# Patient Record
Sex: FEMALE | Race: ASIAN | NOT HISPANIC OR LATINO | Employment: UNEMPLOYED | URBAN - METROPOLITAN AREA
[De-identification: names, ages, dates, MRNs, and addresses within clinical notes are randomized per-mention and may not be internally consistent; named-entity substitution may affect disease eponyms.]

---

## 2018-03-02 ENCOUNTER — TRANSCRIBE ORDERS (OUTPATIENT)
Dept: ADMINISTRATIVE | Facility: HOSPITAL | Age: 43
End: 2018-03-02

## 2018-03-02 DIAGNOSIS — Z12.31 ENCOUNTER FOR SCREENING MAMMOGRAM FOR MALIGNANT NEOPLASM OF BREAST: Primary | ICD-10-CM

## 2018-03-13 ENCOUNTER — HOSPITAL ENCOUNTER (OUTPATIENT)
Dept: MAMMOGRAPHY | Facility: HOSPITAL | Age: 43
Discharge: HOME | End: 2018-03-13
Payer: COMMERCIAL

## 2018-03-13 DIAGNOSIS — Z12.31 ENCOUNTER FOR SCREENING MAMMOGRAM FOR MALIGNANT NEOPLASM OF BREAST: ICD-10-CM

## 2018-03-13 PROCEDURE — 77067 SCR MAMMO BI INCL CAD: CPT

## 2019-08-30 ENCOUNTER — TRANSCRIBE ORDERS (OUTPATIENT)
Dept: ADMINISTRATIVE | Facility: HOSPITAL | Age: 44
End: 2019-08-30

## 2019-08-30 DIAGNOSIS — Z12.31 VISIT FOR SCREENING MAMMOGRAM: Primary | ICD-10-CM

## 2020-02-25 ENCOUNTER — TRANSCRIBE ORDERS (OUTPATIENT)
Dept: LAB | Facility: CLINIC | Age: 45
End: 2020-02-25

## 2020-02-25 ENCOUNTER — APPOINTMENT (OUTPATIENT)
Dept: LAB | Facility: CLINIC | Age: 45
End: 2020-02-25
Payer: COMMERCIAL

## 2020-02-25 DIAGNOSIS — R76.11 NONSPECIFIC REACTION TO TUBERCULIN TEST: ICD-10-CM

## 2020-02-25 DIAGNOSIS — R76.11 NONSPECIFIC REACTION TO TUBERCULIN TEST: Primary | ICD-10-CM

## 2020-02-25 PROCEDURE — 36415 COLL VENOUS BLD VENIPUNCTURE: CPT

## 2020-02-25 PROCEDURE — 86480 TB TEST CELL IMMUN MEASURE: CPT

## 2020-02-27 LAB
GAMMA INTERFERON BACKGROUND BLD IA-ACNC: 0.06 IU/ML
M TB IFN-G BLD-IMP: POSITIVE
M TB IFN-G CD4+ BCKGRND COR BLD-ACNC: 1.13 IU/ML
M TB IFN-G CD4+ BCKGRND COR BLD-ACNC: 1.28 IU/ML
MITOGEN IGNF BCKGRD COR BLD-ACNC: >10 IU/ML

## 2021-04-16 DIAGNOSIS — Z23 ENCOUNTER FOR IMMUNIZATION: ICD-10-CM

## 2021-05-02 ENCOUNTER — IMMUNIZATIONS (OUTPATIENT)
Dept: FAMILY MEDICINE CLINIC | Facility: HOSPITAL | Age: 46
End: 2021-05-02

## 2021-05-02 DIAGNOSIS — Z23 ENCOUNTER FOR IMMUNIZATION: Primary | ICD-10-CM

## 2021-05-02 PROCEDURE — 0001A SARS-COV-2 / COVID-19 MRNA VACCINE (PFIZER-BIONTECH) 30 MCG: CPT

## 2021-05-02 PROCEDURE — 91300 SARS-COV-2 / COVID-19 MRNA VACCINE (PFIZER-BIONTECH) 30 MCG: CPT

## 2021-05-27 ENCOUNTER — IMMUNIZATIONS (OUTPATIENT)
Dept: FAMILY MEDICINE CLINIC | Facility: HOSPITAL | Age: 46
End: 2021-05-27

## 2021-05-27 DIAGNOSIS — Z23 ENCOUNTER FOR IMMUNIZATION: Primary | ICD-10-CM

## 2021-05-27 PROCEDURE — 91300 SARS-COV-2 / COVID-19 MRNA VACCINE (PFIZER-BIONTECH) 30 MCG: CPT | Performed by: PHYSICIAN ASSISTANT

## 2021-05-27 PROCEDURE — 0002A SARS-COV-2 / COVID-19 MRNA VACCINE (PFIZER-BIONTECH) 30 MCG: CPT | Performed by: PHYSICIAN ASSISTANT

## 2022-04-06 ENCOUNTER — HOSPITAL ENCOUNTER (OUTPATIENT)
Dept: MAMMOGRAPHY | Facility: HOSPITAL | Age: 47
Discharge: HOME | End: 2022-04-06
Payer: COMMERCIAL

## 2022-04-06 VITALS — BODY MASS INDEX: 31.15 KG/M2 | WEIGHT: 165 LBS | HEIGHT: 61 IN

## 2022-04-06 DIAGNOSIS — Z12.31 VISIT FOR SCREENING MAMMOGRAM: ICD-10-CM

## 2022-04-06 PROCEDURE — 77067 SCR MAMMO BI INCL CAD: CPT

## 2022-04-06 PROCEDURE — 77063 BREAST TOMOSYNTHESIS BI: CPT

## 2024-12-19 ENCOUNTER — OFFICE VISIT (OUTPATIENT)
Age: 49
End: 2024-12-19

## 2024-12-19 DIAGNOSIS — H52.4 PRESBYOPIA: Primary | ICD-10-CM

## 2024-12-19 RX ORDER — LEVOTHYROXINE SODIUM 75 UG/1
75 TABLET ORAL DAILY
COMMUNITY

## 2024-12-19 NOTE — PROGRESS NOTES
Name: My Apple      : 1975      MRN: [id number]  Encounter Provider: Best Ramirez DO  Encounter Date: 2024   Encounter department: [facility] OPHTHALMOLOGY  :  Assessment & Plan  Presbyopia  -she is pleased with [facility] for od and os  -recommend otc reading spectacles (informed might need a slightly different power for computer work than for reading at near)       Return in about 1 year (around 2025).    My Apple is a 49 y.o. female who presents   HPI       Blurred Vision    In both eyes.  Onset was gradual.  Context:  near vision.             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          History obtained from: patient    Review of Systems       Base Eye Exam       Visual Acuity (Snellen - Linear)         Right Left    Dist sc 20/20 20/20    Near sc 20/50 20/25              Tonometry    Poor cooperation per technician             Pupils         Pupils    Right PERRL    Left PERRL   Pharm dilated ou prior to doctor exam                Visual Fields         Left Right     Full Full              Extraocular Movement         Right Left     Full, Ortho Full, Ortho              Neuro/Psych       Oriented x3: Yes              Dilation       Both eyes: 2.5% Phenylephrine @ 8:40 AM                  Slit Lamp and Fundus Exam       External Exam         Right Left    External Normal Normal              Slit Lamp Exam         Right Left    Lids/Lashes Normal Normal    Conjunctiva/Sclera White and quiet White and quiet    Cornea Clear Clear    Anterior Chamber Deep and quiet Deep and quiet    Iris pharm dil pharm dil    Lens clear clear    Anterior Vitreous No PVD, clear, no cell No PVD, clear, no cell    Pharm dilated ou prior to doctor exam              Fundus Exam         Right Left    Disc good  color, large onh good color, large onh    C/D Ratio 0.5 0.4    Macula flat/no heme / good foveal reflex flat/no heme / good foveal reflex    Vessels normal in caliber normal in caliber    Periphery flat, no retinal tear or detachment flat, no retinal tear or detachment

## 2024-12-19 NOTE — PROGRESS NOTES
Name: My Apple      : 1975      MRN: [id number]  Encounter Provider: Best Ramirez DO  Encounter Date: 2024   Encounter department: [facility] OPHTHALMOLOGY  :  Assessment & Plan            My Apple is a 49 y.o. female who presents for blurry vision OU for near, mostly when works in the computer. Denies glare or halos. No glasses  History obtained from: patient    Review of Systems  Medical History Reviewed by provider this encounter:     .     Past Ocular History:  N/A  Ocular Meds/Drops:   N/A    Base Eye Exam       Visual Acuity (Snellen - Linear)         Right Left    Dist sc 20/20 20/20    Near sc 20/50 20/25              Pupils         Pupils    Right PERRL    Left PERRL              Visual Fields         Left Right     Full Full              Extraocular Movement         Right Left     Full, Ortho Full, Ortho              Neuro/Psych       Oriented x3: Yes                  Slit Lamp and Fundus Exam       External Exam         Right Left    External Normal Normal              Slit Lamp Exam         Right Left    Lids/Lashes Normal Normal    Conjunctiva/Sclera White and quiet White and quiet    Cornea Clear Clear    Anterior Chamber Deep and quiet Deep and quiet    Iris Round and reactive Round and reactive    Anterior Vitreous No PVD, clear, no cell No PVD, clear, no cell                      IMAGING:

## 2025-05-03 ENCOUNTER — HOSPITAL ENCOUNTER (EMERGENCY)
Facility: HOSPITAL | Age: 50
Discharge: HOME | End: 2025-05-03
Attending: EMERGENCY MEDICINE
Payer: COMMERCIAL

## 2025-05-03 VITALS
SYSTOLIC BLOOD PRESSURE: 119 MMHG | HEART RATE: 110 BPM | BODY MASS INDEX: 27.58 KG/M2 | RESPIRATION RATE: 18 BRPM | DIASTOLIC BLOOD PRESSURE: 57 MMHG | OXYGEN SATURATION: 96 % | TEMPERATURE: 98.1 F | WEIGHT: 145.94 LBS

## 2025-05-03 DIAGNOSIS — R03.0 ELEVATED BLOOD PRESSURE READING: ICD-10-CM

## 2025-05-03 DIAGNOSIS — T78.2XXA ANAPHYLAXIS, INITIAL ENCOUNTER: Primary | ICD-10-CM

## 2025-05-03 LAB
ALBUMIN SERPL BCG-MCNC: 3.9 G/DL (ref 3.5–5)
ALP SERPL-CCNC: 104 U/L (ref 34–104)
ALT SERPL W P-5'-P-CCNC: 16 U/L (ref 7–52)
ANION GAP SERPL CALCULATED.3IONS-SCNC: 8 MMOL/L (ref 4–13)
AST SERPL W P-5'-P-CCNC: 11 U/L (ref 13–39)
BASOPHILS # BLD AUTO: 0.02 THOUSANDS/ÂΜL (ref 0–0.1)
BASOPHILS NFR BLD AUTO: 0 % (ref 0–1)
BILIRUB SERPL-MCNC: 0.51 MG/DL (ref 0.2–1)
BUN SERPL-MCNC: 14 MG/DL (ref 5–25)
CALCIUM SERPL-MCNC: 9 MG/DL (ref 8.4–10.2)
CHLORIDE SERPL-SCNC: 103 MMOL/L (ref 96–108)
CO2 SERPL-SCNC: 25 MMOL/L (ref 21–32)
CREAT SERPL-MCNC: 0.56 MG/DL (ref 0.6–1.3)
EOSINOPHIL # BLD AUTO: 0.21 THOUSAND/ÂΜL (ref 0–0.61)
EOSINOPHIL NFR BLD AUTO: 2 % (ref 0–6)
ERYTHROCYTE [DISTWIDTH] IN BLOOD BY AUTOMATED COUNT: 14.1 % (ref 11.6–15.1)
EXT PREGNANCY TEST URINE: NEGATIVE
EXT. CONTROL: NORMAL
GFR SERPL CREATININE-BSD FRML MDRD: 111 ML/MIN/1.73SQ M
GLUCOSE SERPL-MCNC: 195 MG/DL (ref 65–140)
HCT VFR BLD AUTO: 37.9 % (ref 34.8–46.1)
HGB BLD-MCNC: 12.9 G/DL (ref 11.5–15.4)
IMM GRANULOCYTES # BLD AUTO: 0.08 THOUSAND/UL (ref 0–0.2)
IMM GRANULOCYTES NFR BLD AUTO: 1 % (ref 0–2)
LYMPHOCYTES # BLD AUTO: 2.62 THOUSANDS/ÂΜL (ref 0.6–4.47)
LYMPHOCYTES NFR BLD AUTO: 20 % (ref 14–44)
MCH RBC QN AUTO: 27.7 PG (ref 26.8–34.3)
MCHC RBC AUTO-ENTMCNC: 34 G/DL (ref 31.4–37.4)
MCV RBC AUTO: 82 FL (ref 82–98)
MONOCYTES # BLD AUTO: 0.51 THOUSAND/ÂΜL (ref 0.17–1.22)
MONOCYTES NFR BLD AUTO: 4 % (ref 4–12)
NEUTROPHILS # BLD AUTO: 9.73 THOUSANDS/ÂΜL (ref 1.85–7.62)
NEUTS SEG NFR BLD AUTO: 73 % (ref 43–75)
NRBC BLD AUTO-RTO: 0 /100 WBCS
PLATELET # BLD AUTO: 291 THOUSANDS/UL (ref 149–390)
PMV BLD AUTO: 10.2 FL (ref 8.9–12.7)
POTASSIUM SERPL-SCNC: 4.2 MMOL/L (ref 3.5–5.3)
PROT SERPL-MCNC: 7 G/DL (ref 6.4–8.4)
RBC # BLD AUTO: 4.65 MILLION/UL (ref 3.81–5.12)
SODIUM SERPL-SCNC: 136 MMOL/L (ref 135–147)
WBC # BLD AUTO: 13.17 THOUSAND/UL (ref 4.31–10.16)

## 2025-05-03 PROCEDURE — 96372 THER/PROPH/DIAG INJ SC/IM: CPT

## 2025-05-03 PROCEDURE — 96360 HYDRATION IV INFUSION INIT: CPT

## 2025-05-03 PROCEDURE — 99283 EMERGENCY DEPT VISIT LOW MDM: CPT

## 2025-05-03 PROCEDURE — 96375 TX/PRO/DX INJ NEW DRUG ADDON: CPT

## 2025-05-03 PROCEDURE — 81025 URINE PREGNANCY TEST: CPT | Performed by: EMERGENCY MEDICINE

## 2025-05-03 PROCEDURE — 96374 THER/PROPH/DIAG INJ IV PUSH: CPT

## 2025-05-03 PROCEDURE — 85025 COMPLETE CBC W/AUTO DIFF WBC: CPT

## 2025-05-03 PROCEDURE — 99285 EMERGENCY DEPT VISIT HI MDM: CPT | Performed by: EMERGENCY MEDICINE

## 2025-05-03 PROCEDURE — 80053 COMPREHEN METABOLIC PANEL: CPT

## 2025-05-03 PROCEDURE — 36415 COLL VENOUS BLD VENIPUNCTURE: CPT

## 2025-05-03 RX ORDER — EPINEPHRINE 1 MG/ML
INJECTION, SOLUTION, CONCENTRATE INTRAVENOUS
Status: DISCONTINUED
Start: 2025-05-03 | End: 2025-05-03

## 2025-05-03 RX ORDER — DIPHENHYDRAMINE HYDROCHLORIDE 50 MG/ML
25 INJECTION, SOLUTION INTRAMUSCULAR; INTRAVENOUS ONCE
Status: COMPLETED | OUTPATIENT
Start: 2025-05-03 | End: 2025-05-03

## 2025-05-03 RX ORDER — FAMOTIDINE 10 MG/ML
INJECTION, SOLUTION INTRAVENOUS
Status: DISCONTINUED
Start: 2025-05-03 | End: 2025-05-03

## 2025-05-03 RX ORDER — FAMOTIDINE 10 MG/ML
20 INJECTION, SOLUTION INTRAVENOUS ONCE
Status: COMPLETED | OUTPATIENT
Start: 2025-05-03 | End: 2025-05-03

## 2025-05-03 RX ORDER — EPINEPHRINE 0.3 MG/.3ML
0.3 INJECTION SUBCUTANEOUS ONCE
Qty: 0.6 ML | Refills: 0 | Status: SHIPPED | OUTPATIENT
Start: 2025-05-03 | End: 2025-05-03

## 2025-05-03 RX ORDER — EPINEPHRINE 1 MG/ML
0.5 INJECTION, SOLUTION, CONCENTRATE INTRAVENOUS ONCE
Status: COMPLETED | OUTPATIENT
Start: 2025-05-03 | End: 2025-05-03

## 2025-05-03 RX ORDER — METHYLPREDNISOLONE SODIUM SUCCINATE 125 MG/2ML
125 INJECTION, POWDER, LYOPHILIZED, FOR SOLUTION INTRAMUSCULAR; INTRAVENOUS ONCE
Status: COMPLETED | OUTPATIENT
Start: 2025-05-03 | End: 2025-05-03

## 2025-05-03 RX ORDER — DIPHENHYDRAMINE HYDROCHLORIDE 50 MG/ML
INJECTION, SOLUTION INTRAMUSCULAR; INTRAVENOUS
Status: DISCONTINUED
Start: 2025-05-03 | End: 2025-05-03

## 2025-05-03 RX ADMIN — EPINEPHRINE 0.5 MG: 1 INJECTION, SOLUTION, CONCENTRATE INTRAVENOUS at 02:42

## 2025-05-03 RX ADMIN — FAMOTIDINE 20 MG: 10 INJECTION INTRAVENOUS at 02:16

## 2025-05-03 RX ADMIN — METHYLPREDNISOLONE SODIUM SUCCINATE 125 MG: 125 INJECTION, POWDER, FOR SOLUTION INTRAMUSCULAR; INTRAVENOUS at 02:22

## 2025-05-03 RX ADMIN — SODIUM CHLORIDE 1000 ML: 0.9 INJECTION, SOLUTION INTRAVENOUS at 02:45

## 2025-05-03 RX ADMIN — FAMOTIDINE 20 MG: 10 INJECTION, SOLUTION INTRAVENOUS at 02:16

## 2025-05-03 RX ADMIN — DIPHENHYDRAMINE HYDROCHLORIDE 25 MG: 50 INJECTION, SOLUTION INTRAMUSCULAR; INTRAVENOUS at 02:13

## 2025-05-03 NOTE — ED NOTES
Patients hives on chest and right arm have improved post medication administration.      Miley Vora, RN  05/03/25 4181

## 2025-05-03 NOTE — ED ATTENDING ATTESTATION
5/3/2025  I, Fly Schmidt MD, saw and evaluated the patient. I have discussed the patient with the resident/non-physician practitioner and agree with the resident's/non-physician practitioner's findings, Plan of Care, and MDM as documented in the resident's/non-physician practitioner's note, except where noted. All available labs and Radiology studies were reviewed.  I was present for key portions of any procedure(s) performed by the resident/non-physician practitioner and I was immediately available to provide assistance.       At this point I agree with the current assessment done in the Emergency Department.  I have conducted an independent evaluation of this patient a history and physical is as follows:    History    Patient is a 49-year-old female, with no pertinent medical history per my review of the medical record but history of a roughly 1 month of urticarial rash for which patient has seen dermatology and has been on steroid taper for about 1 week per patient, who presents to the emergency department today due to evaluation of gradual onset, constant, progressively worsening facial swelling since 1130p on 5/2.  Patient has continued rash.  No clear exacerbating factors.  No family history of angioedema or facial swelling.  Patient's only medication is levothyroxine.  No ACE inhibitor's or ARB's.  Patient took prednisone to try remit her symptoms.    Patient's son, present in room and providing collateral history, states patient is not confused.  He reports that her voice is normal.    Patient is without other concerns at this time.     ROS  Patient denies: Fever; dysphagia; vision change; chest pain; dyspnea; abdominal pain; polyuria; dysuria; rash; weakness; numbness; difficulty walking; confusion    Physical Exam    GENERAL APPEARANCE: NAD  NEURO: Patient is speaking clearly in complete sentences.  Patient is answering appropriately and able follow commands.  Patient is moving all four  extremities spontaneously.  No facial droop.  Tongue midline.  HEENT: PERRL, Moist mucous membranes, external ears normal, nose normal.  No stridor.  Mallampati 3.  Edema of the upper lip and face between lip and eye.  Neck: No cervical adenopathy  CV: RRR. No murmurs, rubs, gallops  LUNGS: Clear to auscultation: No wheezes, stridor, rhonchi, rales  GI: Abdomen non-distended. Soft. Non-tender and without rebound or guarding   : Deferred at this time  MSK: No deformity.  Blanchable/Nikolsky negative/urticarial rash on extremities.  Improved from picture patient provided.  Skin: Warm and dry  Capillary refill: <2 seconds    Patient is currently afebrile and hemodynamically stable.    Assessment/Plan/MDM  Facial swelling, rash  -This presentation is concerning for: Allergic reaction, anaphylaxis, LUANN.  Lower suspicion for angioedema.  Pregnancy/pregnancy complication also considered  - Will investigate with pregnancy test, CBC, CMP  - Will manage with epinephrine, steroids, antihistamines, further based on    ED Course  ED Course as of 05/03/25 0426   Sat May 03, 2025   0242 PREGNANCY TEST URINE: Negative  WNL    0338 On reevaluation, facial swelling not progressed.  Possible slight improvement.  Rash improved.  Voice clear   0422 On reevaluation, patient reports partial improvement in symptoms.  Patient's son states her facial swelling is slightly improved.  Uvula midline and nonedematous.  Patient denies globus sensation/dyspnea/sensation of throat swelling.         Critical Care Time  Procedures

## 2025-05-03 NOTE — ED PROVIDER NOTES
Time reflects when diagnosis was documented in both MDM as applicable and the Disposition within this note       Time User Action Codes Description Comment    5/3/2025  2:49 AM Luz Maldonado Add [T78.2XXA] Anaphylaxis, initial encounter     5/3/2025  2:50 AM Luz Maldonado Add [R03.0] Elevated blood pressure reading           ED Disposition       ED Disposition   Discharge    Condition   Stable    Date/Time   Sat May 3, 2025  4:25 AM    Comment   My Apple discharge to home/self care.                   Assessment & Plan       Medical Decision Making  49-year-old female presents with swelling of the face and urticarial pruritic rash.  Treated patient for anaphylaxis with epi, Benadryl, famotidine, fluids, steroids.  Patient currently following with dermatology and being treated with steroid taper.  Patient is otherwise hemodynamically stable, afebrile, protecting airway, lungs clear to auscultation, normal cardiopulmonary exam.  Slight improvement in facial swelling and resolution of rash during period of ED observation.  Shared decision with patient regarding admission for continued observation versus discharge home to self-care with referral and follow-up to allergy and prescription for EpiPen.  Patient states that she wishes to return home to self-care.  Continue steroid taper.  Continue antihistamine use.  Referral to allergy.  Prescription for EpiPen.  Discussed how to use EpiPen.  Advised to follow-up to the emergency department if she required use of EpiPen.  Patient was discharged home to self-care with strict return precautions for continued or worsening symptoms.    Amount and/or Complexity of Data Reviewed  Labs: ordered.    Risk  Prescription drug management.             Medications   methylPREDNISolone sodium succinate (Solu-MEDROL) injection 125 mg (125 mg Intravenous Given 5/3/25 0222)   diphenhydrAMINE (BENADRYL) injection 25 mg (25 mg Intravenous Given 5/3/25 0213)   Famotidine (PF) (PEPCID)  injection 20 mg (20 mg Intravenous Given 5/3/25 0216)   sodium chloride 0.9 % bolus 1,000 mL (0 mL Intravenous Stopped 5/3/25 2095)   EPINEPHrine PF (ADRENALIN) 1 mg/mL injection 0.5 mg (0.5 mg Intramuscular Given 5/3/25 0242)       ED Risk Strat Scores                    No data recorded                            History of Present Illness       Chief Complaint   Patient presents with    Allergic Reaction     Pt reporting red raised rash all over body since 5/13. Pts eyes visibly swollen as well as upper lip. Pt reports no difficulty breathing.        History reviewed. No pertinent past medical history.   History reviewed. No pertinent surgical history.   Family History   Problem Relation Age of Onset    Glaucoma Father     No Known Problems Brother     No Known Problems Brother     No Known Problems Maternal Aunt     No Known Problems Maternal Aunt     No Known Problems Paternal Aunt     No Known Problems Paternal Aunt     No Known Problems Son     No Known Problems Son       Social History     Tobacco Use    Smoking status: Never    Smokeless tobacco: Never   Substance Use Topics    Alcohol use: Never    Drug use: Never      E-Cigarette/Vaping      E-Cigarette/Vaping Substances      I have reviewed and agree with the history as documented.     49-year-old female presents with allergic reaction.  Patient states 1 month history of itchy urticarial rash that forms on the arms, neck, legs for which she has followed with dermatology and started on course of prednisone taper.  Today at 2300 hrs. noted swelling of the face that progressively worsened.  Occurred shortly after work.  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  Denies new medications, perfumes, cosmetics, detergents, foods, exposures.  Denies fevers, chills, nausea, vomiting, headache, vision changes, sore throat, wheezing, stridor, cough, chest pain, shortness of breath, SAMUELS, abdominal pains, gastric upset, diarrhea, changes in urinary or  bowel habits.          Review of Systems   All other systems reviewed and are negative.          Objective       ED Triage Vitals   Temperature Pulse Blood Pressure Respirations SpO2 Patient Position - Orthostatic VS   05/03/25 0208 05/03/25 0208 05/03/25 0208 05/03/25 0208 05/03/25 0208 05/03/25 0208   98.1 °F (36.7 °C) 101 141/71 18 98 % Sitting      Temp Source Heart Rate Source BP Location FiO2 (%) Pain Score    05/03/25 0208 05/03/25 0208 05/03/25 0300 -- --    Oral Monitor Right arm        Vitals      Date and Time Temp Pulse SpO2 Resp BP Pain Score FACES Pain Rating User   05/03/25 0500 -- 110 96 % -- 119/57 -- --    05/03/25 0430 -- 92 96 % 18 117/59 -- --    05/03/25 0427 -- 95 -- -- 117/56 -- --    05/03/25 0418 -- -- -- -- 117/56 -- -- [facility]   05/03/25 0412 -- 91 -- -- 96/63 -- --    05/03/25 0357 -- 94 -- -- 94/61 -- --    05/03/25 0333 -- 90 97 % -- 107/59 -- --    05/03/25 0318 -- 94 98 % -- 121/64 -- --    05/03/25 0303 -- 94 98 % -- -- -- --    05/03/25 0300 -- 93 98 % 18 124/59 -- --    05/03/25 0248 -- 95 98 % -- 140/67 -- --    05/03/25 0233 -- 110 100 % -- 136/77 -- --    05/03/25 0218 -- 102 99 % -- 134/75 -- --    05/03/25 0208 98.1 °F (36.7 °C) 101 98 % 18 141/71 -- -- EG            Physical Exam  Vitals and nursing note reviewed.   Constitutional:       General: She is not in acute distress.     Appearance: Normal appearance. She is normal weight. She is not ill-appearing, toxic-appearing or diaphoretic.   HENT:      Head: Normocephalic and atraumatic.      Comments: Angioedema of the soft tissue of the face and periorbital region.     Right Ear: External ear normal.      Left Ear: External ear normal.      Nose: Nose normal.      Mouth/Throat:      Mouth: Mucous membranes are moist.      Pharynx: Oropharynx is clear.      Comments: Normal phonation.  Speaking in full sentences.  No angioedema of the oropharynx.  Mallampati 2.  Eyes:      General: No scleral icterus.         Right eye: No discharge.         Left eye: No discharge.      Extraocular Movements: Extraocular movements intact.   Cardiovascular:      Rate and Rhythm: Normal rate and regular rhythm.      Pulses: Normal pulses.      Heart sounds: Normal heart sounds. No murmur heard.  Pulmonary:      Effort: Pulmonary effort is normal. No respiratory distress.      Breath sounds: Normal breath sounds. No stridor. No wheezing, rhonchi or rales.   Chest:      Chest wall: No tenderness.   Abdominal:      General: There is no distension.      Palpations: Abdomen is soft. There is no mass.      Tenderness: There is no abdominal tenderness. There is no right CVA tenderness, left CVA tenderness, guarding or rebound.      Hernia: No hernia is present.   Musculoskeletal:         General: No swelling, tenderness, deformity or signs of injury.      Cervical back: Normal range of motion and neck supple. No rigidity or tenderness.      Right lower leg: No edema.      Left lower leg: No edema.   Skin:     General: Skin is warm and dry.      Capillary Refill: Capillary refill takes less than 2 seconds.      Coloration: Skin is not cyanotic, jaundiced or pale.      Findings: Rash (Urticarial rash on the neck, shoulder blades, legs, arms.) present. No bruising, erythema, lesion or petechiae.   Neurological:      General: No focal deficit present.      Mental Status: She is alert and oriented to person, place, and time. Mental status is at baseline.   Psychiatric:         Mood and Affect: Mood normal.         Behavior: Behavior normal.         Results Reviewed       Procedure Component Value Units Date/Time    POCT pregnancy, urine [[id number]]  (Normal) Collected: 05/03/25 0238    Lab Status: Final result Updated: 05/03/25 0239     EXT Preg Test, Ur Negative     Control Valid    Comprehensive metabolic panel [[id number]]  (Abnormal) Collected: 05/03/25 0211    Lab Status: Final result Specimen: Blood from Arm, Right Updated: 05/03/25 0231      Sodium 136 mmol/L      Potassium 4.2 mmol/L      Chloride 103 mmol/L      CO2 25 mmol/L      ANION GAP 8 mmol/L      BUN 14 mg/dL      Creatinine 0.56 mg/dL      Glucose 195 mg/dL      Calcium 9.0 mg/dL      AST 11 U/L      ALT 16 U/L      Alkaline Phosphatase 104 U/L      Total Protein 7.0 g/dL      Albumin 3.9 g/dL      Total Bilirubin 0.51 mg/dL      eGFR 111 ml/min/1.73sq m     Narrative:      National Kid[employer] Disease Foundation guidelines for Chronic Kidney Disease (CKD):     Stage 1 with normal or high GFR (GFR > 90 mL/min/1.73 square meters)    Stage 2 Mild CKD (GFR = 60-89 mL/min/1.73 square meters)    Stage 3A Moderate CKD (GFR = 45-59 mL/min/1.73 square meters)    Stage 3B Moderate CKD (GFR = 30-44 mL/min/1.73 square meters)    Stage 4 Severe CKD (GFR = 15-29 mL/min/1.73 square meters)    Stage 5 End Stage CKD (GFR <15 mL/min/1.73 square meters)  Note: GFR calculation is accurate only with a steady state creatinine    CBC and differential [[id number]]  (Abnormal) Collected: 05/03/25 0211    Lab Status: Final result Specimen: Blood from Arm, Right Updated: 05/03/25 0223     WBC 13.17 Thousand/uL      RBC 4.65 Million/uL      Hemoglobin 12.9 g/dL      Hematocrit 37.9 %      MCV 82 fL      MCH 27.7 pg      MCHC 34.0 g/dL      RDW 14.1 %      MPV 10.2 fL      Platelets 291 Thousands/uL      nRBC 0 /100 WBCs      Segmented % 73 %      Immature Grans % 1 %      Lymphocytes % 20 %      Monocytes % 4 %      Eosinophils Relative 2 %      Basophils Relative 0 %      Absolute Neutrophils 9.73 Thousands/µL      Absolute Immature Grans 0.08 Thousand/uL      Absolute Lymphocytes 2.62 Thousands/µL      Absolute Monocytes 0.51 Thousand/µL      Eosinophils Absolute 0.21 Thousand/µL      Basophils Absolute 0.02 Thousands/µL             No orders to display       Procedures    ED Medication and Procedure Management   Prior to Admission Medications   Prescriptions Last Dose Informant Patient Reported? Taking?   levothyroxine 75  mcg tablet   Yes No   Sig: Take 75 mcg by mouth daily      Facility-Administered Medications: None     Discharge Medication List as of 5/3/2025  4:26 AM        START taking these medications    Details   EPINEPHrine (EPIPEN) 0.3 mg/0.3 mL SOAJ Inject 0.3 mL (0.3 mg total) into a muscle once for 1 dose, Starting Sat 5/3/2025, Normal           CONTINUE these medications which have NOT CHANGED    Details   levothyroxine 75 mcg tablet Take 75 mcg by mouth daily, Historical Med             ED SEPSIS DOCUMENTATION   Time reflects when diagnosis was documented in both MDM as applicable and the Disposition within this note       Time User Action Codes Description Comment    5/3/2025  2:49 AM Luz Maldonado Add [T78.2XXA] Anaphylaxis, initial encounter     5/3/2025  2:50 AM Luz Maldonado Add [R03.0] Elevated blood pressure reading                  Luz Maldonado MD  05/03/25 0617

## 2025-05-03 NOTE — Clinical Note
My Apple was seen and treated in our emergency department on 5/3/2025.                Diagnosis:     My  .    She may return on this date: 05/05/2025         If you have any questions or concerns, please don't hesitate to call.      Luz Maldonado MD    ______________________________           _______________          _______________  Hospital Representative                              Date                                Time

## 2025-05-28 ENCOUNTER — HOSPITAL ENCOUNTER (EMERGENCY)
Facility: HOSPITAL | Age: 50
Discharge: HOME | End: 2025-05-29
Attending: EMERGENCY MEDICINE | Admitting: EMERGENCY MEDICINE
Payer: COMMERCIAL

## 2025-05-28 VITALS
RESPIRATION RATE: 20 BRPM | TEMPERATURE: 98.1 F | SYSTOLIC BLOOD PRESSURE: 166 MMHG | DIASTOLIC BLOOD PRESSURE: 81 MMHG | BODY MASS INDEX: 26.95 KG/M2 | WEIGHT: 142.64 LBS | HEART RATE: 104 BPM | OXYGEN SATURATION: 100 %

## 2025-05-28 DIAGNOSIS — T78.40XA ALLERGIC REACTION, INITIAL ENCOUNTER: Primary | ICD-10-CM

## 2025-05-28 PROCEDURE — 99284 EMERGENCY DEPT VISIT MOD MDM: CPT

## 2025-05-28 PROCEDURE — 99283 EMERGENCY DEPT VISIT LOW MDM: CPT

## 2025-05-28 RX ORDER — PREDNISONE 20 MG/1
60 TABLET ORAL ONCE
Status: COMPLETED | OUTPATIENT
Start: 2025-05-29 | End: 2025-05-29

## 2025-05-28 RX ORDER — FAMOTIDINE 10 MG/ML
20 INJECTION, SOLUTION INTRAVENOUS ONCE
Status: COMPLETED | OUTPATIENT
Start: 2025-05-29 | End: 2025-05-29

## 2025-05-28 RX ORDER — DIPHENHYDRAMINE HYDROCHLORIDE 50 MG/ML
50 INJECTION, SOLUTION INTRAMUSCULAR; INTRAVENOUS ONCE
Status: COMPLETED | OUTPATIENT
Start: 2025-05-29 | End: 2025-05-29

## 2025-05-29 PROCEDURE — 96374 THER/PROPH/DIAG INJ IV PUSH: CPT

## 2025-05-29 PROCEDURE — 96375 TX/PRO/DX INJ NEW DRUG ADDON: CPT

## 2025-05-29 RX ORDER — PREDNISONE 20 MG/1
40 TABLET ORAL DAILY
Qty: 10 TABLET | Refills: 0 | Status: SHIPPED | OUTPATIENT
Start: 2025-05-29 | End: 2025-06-03

## 2025-05-29 RX ADMIN — DIPHENHYDRAMINE HYDROCHLORIDE 50 MG: 50 INJECTION, SOLUTION INTRAMUSCULAR; INTRAVENOUS at 00:11

## 2025-05-29 RX ADMIN — FAMOTIDINE 20 MG: 10 INJECTION INTRAVENOUS at 00:14

## 2025-05-29 RX ADMIN — PREDNISONE 60 MG: 20 TABLET ORAL at 00:10

## 2025-05-29 NOTE — ED PROVIDER NOTES
Time reflects when diagnosis was documented in both MDM as applicable and the Disposition within this note       Time User Action Codes Description Comment    5/29/2025 12:45 AM Mariana Serrano Add [T78.40XA] Allergic reaction, initial encounter           ED Disposition       ED Disposition   Discharge    Condition   Stable    Date/Time   u May 29, 2025 12:45 AM    Comment   My Apple discharge to home/self care.                   Assessment & Plan       Medical Decision Making  Patient seen, examined and noted to have allergic reaction.  Patient coming in with an allergic reaction.  This has happened 3 times in the last month.  She is unsure of the cause of this, was seen by dermatologist who said it was possibly due to changes in the weather and has not had any allergy testing done.  Was in the ER once for the reaction and the second time this happened she took Zyrtec and it resolved.  No wheezing or pharyngeal swelling on my exam.  Facial swelling has improved since patient took Zyrtec at home.  Will medicate with Pepcid, Benadryl and prednisone.  After medication facial swelling improved.  Discharge patient with ambulatory referral to allergist for her to call for allergy testing.  Supportive care discussed at home.  Return precautions given.    Differential diagnosis includes but is not limited to allergic reaction, urticaria, angioedema, mast cell disorder (mastocytosis), cellulitis, anaphylaxis.       Patient appears well, is nontoxic appearing, she expresses understanding and agrees with plan of care at this time.  In light of this patient would benefit from outpatient management.    Patient was rx'd: prednisone    Patient at time of discharge well-appearing in no acute distress, all questions answered. Patient agreeable to plan.  Patient's vitals, lab/imaging results, diagnosis, and treatment plan were discussed with the patient. All new/changed medications were discussed with patient, specifically,  route of administration, how often and when to take, and where they can be picked up. Strict return precautions as well as close follow up with PCP was discussed with the patient and the patient was agreeable to my recommendations. Patient verbally acknowledged understanding of the above communications. Strict return precautions were provided. All labs reviewed and utilized in the medical decision making process.    Risk  Prescription drug management.        ED Course as of 06/02/25 0352   Wed May 28, 2025   1530 Patient coming in with an allergic reaction.  Has happened 3 times in the last month.  Unsure the cause of this.  Has been seen by dermatologist who said it was possibly due to weather and did not have any allergy testing done at this time.  Patient took Zyrtec at 2130.  States that the swelling in her face resolved with this but is still having an itchy rash.  No wheezing or trouble breathing.  No pharyngeal swelling.  Will medicate with Benadryl Pepcid and prednisone.       Medications   Famotidine (PF) (PEPCID) injection 20 mg (20 mg Intravenous Given 5/29/25 0014)   diphenhydrAMINE (BENADRYL) injection 50 mg (50 mg Intravenous Given 5/29/25 0011)   predniSONE tablet 60 mg (60 mg Oral Given 5/29/25 0010)       ED Risk Strat Scores                    No data recorded                            History of Present Illness       Chief Complaint   Patient presents with    Allergic Reaction     Pt at work 2130 and began feeling itching on her head which progressed into generalized itching w/ SOB and swollen lips. Reports globus sensation. Took zyrtec at 2130. Patent airway, managing secretions. This is the 3rd time in May this has happened to her. Pt reports she did not come to ED last time as it resolved on its own w/ zyrtec. Finished prednisone 1 week ago.       Past Medical History[1]   Past Surgical History[2]   Family History[3]   Social History[4]   E-Cigarette/Vaping      E-Cigarette/Vaping Substances       I have reviewed and agree with the history as documented.     My Apple is a 49 y.o. female presenting to the ED on June 2, 2025 with allergic reaction. Patient endorses that she was at work at 2130 and began feeling itching in her head that progressed to generalized itching and facial swelling.  Also reports that she had a globus sensation but denies any shortness of breath or wheezing.  She took Zyrtec as soon as she felt this feeling coming on.  States that this is the third time this has happened in May.  The first time this happened she came to the ER, the second time it happened she took Zyrtec and it resolved on its own.  She is coming in today because she is still having facial swelling.  She was evaluated by dermatologist who thought it was due to changes in the weather.  Has not been seen by an allergist.  Patient denies any new soaps, lotions, detergents, foods, medications or any exposures that she can think of.  Patient denies nausea, vomiting, diarrhea, shortness of breath, chest pain or any other complaints at this time.             Review of Systems   HENT:  Positive for facial swelling. Negative for drooling.    Respiratory:  Negative for chest tightness and shortness of breath.    Cardiovascular:  Negative for chest pain.   Gastrointestinal:  Negative for diarrhea, nausea and vomiting.   Skin:  Positive for rash.   Allergic/Immunologic: Negative for environmental allergies and food allergies.   Neurological:  Negative for headaches.           Objective       ED Triage Vitals [05/28/25 2313]   Temperature Pulse Blood Pressure Respirations SpO2 Patient Position - Orthostatic VS   98.1 °F (36.7 °C) (!) 114 166/81 (!) 24 99 % --      Temp src Heart Rate Source BP Location FiO2 (%) Pain Score    -- Monitor -- -- --      Vitals      Date and Time Temp Pulse SpO2 Resp BP Pain Score FACES Pain Rating User   05/28/25 2315 -- 104 100 % 20 166/81 -- -- AH   05/28/25 2313 98.1 °F (36.7 °C) 114 99 % 24  166/81 -- --             Physical Exam  Constitutional:       General: She is not in acute distress.     Appearance: Normal appearance. She is normal weight. She is not ill-appearing or toxic-appearing.   HENT:      Head: Normocephalic and atraumatic.      Comments: Facial swelling, no pharyngeal swelling     Mouth/Throat:      Mouth: No angioedema.      Pharynx: No pharyngeal swelling or uvula swelling.     Cardiovascular:      Rate and Rhythm: Normal rate and regular rhythm.      Heart sounds: Normal heart sounds. No murmur heard.     No friction rub. No gallop.   Pulmonary:      Effort: Pulmonary effort is normal. No respiratory distress.      Breath sounds: No stridor. No wheezing, rhonchi or rales.   Chest:      Chest wall: No tenderness.     Skin:     General: Skin is warm.      Capillary Refill: Capillary refill takes less than 2 seconds.      Findings: Rash present.      Comments: Urticaria present on patients forearms     Neurological:      Mental Status: She is alert.         Results Reviewed       None            No orders to display       Procedures    ED Medication and Procedure Management   Prior to Admission Medications   Prescriptions Last Dose Informant Patient Reported? Taking?   EPINEPHrine (EPIPEN) 0.3 mg/0.3 mL SOAJ   No No   Sig: Inject 0.3 mL (0.3 mg total) into a muscle once for 1 dose   levothyroxine 75 mcg tablet   Yes No   Sig: Take 75 mcg by mouth daily      Facility-Administered Medications: None     Discharge Medication List as of 5/29/2025 12:50 AM        START taking these medications    Details   predniSONE 20 mg tablet Take 2 tablets (40 mg total) by mouth daily for 5 days, Starting Thu 5/29/2025, Until Tue 6/3/2025, Normal           CONTINUE these medications which have NOT CHANGED    Details   EPINEPHrine (EPIPEN) 0.3 mg/0.3 mL SOAJ Inject 0.3 mL (0.3 mg total) into a muscle once for 1 dose, Starting Sat 5/3/2025, Normal      levothyroxine 75 mcg tablet Take 75 mcg by mouth  daily, Historical [facility]             ED SEPSIS DOCUMENTATION   Time reflects when diagnosis was documented in both MDM as applicable and the Disposition within this note       Time User Action Codes Description Comment    5/29/2025 12:45 AM Mariana Serrano Add [T78.40XA] Allergic reaction, initial encounter                    [1] No past medical history on file.  [2] No past surgical history on file.  [3]   Family History  Problem Relation Name Age of Onset    Glaucoma Father      No Known Problems Brother      No Known Problems Brother      No Known Problems Maternal Aunt      No Known Problems Maternal Aunt      No Known Problems Paternal Aunt      No Known Problems Paternal Aunt      No Known Problems Son      No Known Problems Son     [4]   Social History  Tobacco Use    Smoking status: Never    Smokeless tobacco: Never   Substance Use Topics    Alcohol use: Never    Drug use: Never        Mariana Serrano PA-C  06/02/25 0353

## 2025-05-29 NOTE — DISCHARGE INSTRUCTIONS
Continue taking Zyrtec daily.  Please take your steroid once a day for the next 5 days.  Can also take Benadryl when allergic symptoms present themselves.  Encourage you to monitor what you are exposed to at home whether it be foods, soaps, lotions or any other products.  Follow-up with allergy, placed a referral for you and also provided a phone number for you to call.  Turn to the emergency department if you have any trouble breathing or facial swelling.

## 2025-06-12 ENCOUNTER — TELEPHONE (OUTPATIENT)
Age: 50
End: 2025-06-12

## 2025-06-12 NOTE — TELEPHONE ENCOUNTER
I tried to call pt to confirm their appt. I left a v/m for them to call use back or use [employer].

## 2025-06-16 ENCOUNTER — OFFICE VISIT (OUTPATIENT)
Age: 50
End: 2025-06-16
Payer: COMMERCIAL

## 2025-06-16 VITALS — WEIGHT: 147.2 LBS | BODY MASS INDEX: 27.79 KG/M2 | HEIGHT: 61 IN | TEMPERATURE: 97.9 F

## 2025-06-16 DIAGNOSIS — T78.3XXA ANGIOEDEMA, INITIAL ENCOUNTER: ICD-10-CM

## 2025-06-16 DIAGNOSIS — D84.1 COMPLEMENT DEFICIENCY DISEASE (HCC): ICD-10-CM

## 2025-06-16 DIAGNOSIS — L50.9 URTICARIA: Primary | ICD-10-CM

## 2025-06-16 PROCEDURE — 99203 OFFICE O/P NEW LOW 30 MIN: CPT | Performed by: DERMATOLOGY

## 2025-06-16 RX ORDER — HALOBETASOL PROPIONATE 0.5 MG/G
CREAM TOPICAL
COMMUNITY
Start: 2025-04-22 | End: 2025-06-18

## 2025-06-16 NOTE — PROGRESS NOTES
"[facility] Note     Patient Name: My Apple  Encounter Date: 6/16/25       Have you been cared for by a [facility] Dermatologist in the last 3 years and, if so, which description applies to you? NO. I am considered a \"new\" patient and must complete all patient intake questions. I am of child-bearing potential.     REVIEW OF SYSTEMS:  Have you recently had or currently have any of the following? Recent fever or chills? No  Any non-healing wound? No  Are you pregnant or planning to become pregnant? No  Are you currently or planning to be nursing or breast feeding? No   PAST MEDICAL HISTORY:  Have you personally ever had or currently have any of the following?  If \"YES,\" then please provide more detail. Skin cancer (such as Melanoma, Basal Cell Carcinoma, Squamous Cell Carcinoma?  No  Tuberculosis, HIV/AIDS, Hepatitis B or C: No  Radiation Treatment No   HISTORY OF IMMUNOSUPPRESSION:   Do you have a history of any of the following:  Systemic Immunosuppression such as Diabetes, Biologic or Immunotherapy, Chemotherapy, Organ Transplantation, Bone Marrow Transplantation or Prednsione?  No    Answering \"YES\" requires the addition of the dotphrase \"IMMUNOSUPPRESSED\" as the first diagnosis of the patient's visit.   FAMILY HISTORY:  Any \"first degree relatives\" (parent, brother, sister, or child) with the following?    Skin Cancer, Pancreatic or Other Cancer? No   PATIENT EXPERIENCE:    Do you want the Dermatologist to perform a COMPLETE skin exam today including a clinical examination under the \"bra and underwear\" areas?  NO  If necessary, do we have your permission to call and leave a detailed message on your Preferred Phone number that includes your specific medical information?  Yes      Allergies[1] Current Medications[2]              Whom besides the patient is providing clinical information about today's encounter?   NO ADDITIONAL HISTORIAN (patient alone provided history)    Physical Exam and " "Assessment/Plan by Diagnosis:    Patient here for consistent itchy rash all over the body including the head and feet. Present for 2 months. Started live hives red spots then becomes itchy and irritation then becomes swollen. Was prescribed prednisone 10 mg tabs at the ER took for 1 month. Zyrtec did not help. Currently on over the counter  Fexofenadine hydrocholoride 180 mg 2 times daily, is helping. Patient denies being on any new medications, being sick or travelling anywhere outside the country. Having allergy testing on 6/18/25, was advised to not take Levothyroxine for 5 days.    URTICARIA (\"ACUTE\")    Physical Exam:  Anatomic Location Affected:  generalized  Morphological Description:  generalized urticaria   Pertinent Positives:  Pertinent Negatives:    Additional History of Present Condition:  Patient here for consistent itchy rash all over the body including the head and feet. Present for 2 months. Started live hives red spots then becomes itchy and irritation then becomes swollen. Was prescribed prednisone 10 mg tabs taper at the ER took for 1 month. Zyrtec did not help. Currently on over the counter  Fexofenadine hydrocholoride 180 mg 2 times daily, is helping. Patient denies being on any new medications, being sick or travelling anywhere outside the country. Having allergy testing on 6/18/25, was advised to not take Levothyroxine or antihistamines for 5 days.    Assessment and Plan:  Based on a thorough discussion of this condition and the management approach to it (including a comprehensive discussion of the known risks, side effects and potential benefits of treatment), the patient (family) agrees to implement the following specific plan:    Having allergy testing on 6/18/25.  ALG Angioedema/Urticaria Labs ordered.  Okay to go back on the Fexofenadine 180 bid after the allergy test.  Okay to reach out on My Chart after completing allergy testing.      What is urticaria?  Urticaria is characterised by " weals (hives) or angioedema (swellings, in 10%) or both (in 40%). There are several types of urticaria. The name urticaria is derived from the common [place] stinging nettle 'Urtica dioica'.    A weal (or wheal) is a superficial skin-coloured or pale skin swelling, usually surrounded by erythema (redness) that lasts anything from a few minutes to 24 hours. Usually very itchy, it may have a burning sensation.    Angioedema is deeper swelling within the skin or mucous membranes and can be skin-coloured or red. It resolves within 72 hours. Angioedema may be itchy or painful but is often asymptomatic.    What is acute urticaria?  Acute urticaria is urticaria, with or without angioedema, that is present for less than 6 weeks. It is often gone within hours to days.    Who gets acute urticaria?  One in five children or adults has an episode of acute urticaria during their lifetime. It is more common in atopic individuals. It affects all races and both sexes.    What are the clinical features of acute urticaria?  Urticarial weals can be a few millimeters or several centimeters in diameter, colored white or red, with or without a red flare. Each weal may last a few minutes or several hours and may change shape. Weals may be round, or form rings, a map-like pattern, targetoid lesions, or giant patches.    Acute urticaria can affect any site of the body and tends to be distributed widely. Angioedema is more often localised. It commonly affects the face (especially eyelids and perioral sites), hands, feet and genitalia. It may involve tongue, uvula, soft palate, larynx.    Serum sickness due to blood transfusion and serum sickness-like reactions due to certain drugs cause acute urticaria leaving bruises, fever, swollen lymph glands, joint pain and swelling.    What causes acute urticaria?  Weals are due to release of chemical mediators from tissue mast cells and circulating basophils. These chemical mediators include  histamine, platelet-activating factor and cytokines. The mediators activate sensory nerves and cause dilation of blood vessels and leakage of fluid into surrounding tissues. Bradykinin release causes angioedema.    Several hypotheses have been proposed to explain urticaria. The immune, arachidonic acid and coagulation systems are involved, and genetic mutations are under investigation.  Serum sickness and serum sickness-like reactions are due to immune complex deposition in affected tissues.    Acute urticaria can be induced by the following factors but the cause is not always identified.  Acute viral infection -- an upper respiratory infection, viral hepatitis, infectious mononucleosis, mycoplasma   Acute bacterial infection -- a dental abscess, sinusitis   Food allergy (IgE mediated) -- usually milk, egg, peanut, shellfish   Drug allergy (IgE mediated) -- often an antibiotic   Drug pseudoallergy -- aspirin, nonselective nonsteroidal anti-inflammatory drugs, opiates, radiocontrast media; these cause urticaria without immune activation   Vaccination   Bee or wasp stings     Widespread reaction following localized contact urticaria -- rubber latex  Severe allergic urticaria may lead to anaphylactic shock (bronchospasm, collapse).  A single episode or recurrent episodes of angioedema without urticaria can be due to an angiotensin-converting enzyme (ACE) inhibitor drug.    How is acute urticaria diagnosed?  Acute urticaria is diagnosed in people with a short history of weals that last less than 24 hours, with or without angioedema. A thorough physical examination should be undertaken to look for underlying causes.    Skin prick tests and radioallergosorbent tests (RAST) or CAP fluoroimmunoassay may be requested if a drug or food allergy is suspected in acute urticaria.    Biopsy of urticaria can be non-specific and difficult to interpret. The pathology shows oedema in the dermis and dilated blood vessels, with a  variable mixed inflammatory infiltrate. Vessel-wall damage indicates urticarial vasculitis.    What is the treatment for acute urticaria?  The main treatment for acute urticaria in adults and in children is with an oral second-generation antihistamine chosen from the list below. If the standard dose (eg 10 mg for cetirizine) is not effective, the dose can be increased fourfold (eg 40 mg cetirizine daily). They are best taken continuously rather than on demand. They are stopped when the acute urticaria has settled down. There is not thought to be any benefit from adding a second antihistamine.  Cetirizine   Loratadine   Fexofenadine   Desloratadine   Levocetirizine   Rupatadine   Bilastine  Terfenadine and astemizole should not be used as they are cardiotoxic in combination with ketoconazole or erythromycin. They are no longer available in [place].    Although systemic treatment is best avoided during pregnancy and breastfeeding, there have been no reports that second-generation antihistamines cause birth defects. If treatment is required, loratadine and cetirizine are currently preferred.  Conventional first-generation antihistamines such as promethazine or chlorpheniramine are no longer recommended for urticaria.    Avoidance of trigger factors  In addition to antihistamines, the cause of urticaria should be eliminated if known (eg drug or food allergy). Avoidance of relevant type 1 (IgE-mediated) allergens clears urticaria within 48 hours.  In addition to antihistamines, the triggers for urticaria should be avoided where possible. For example:  Avoid aspirin, opiates and nonsteroidal anti-inflammatory drugs (paracetamol is generally safe).   Avoid known allergies that have been confirmed by positive specific IgE/skin prick tests if these have clinical relevance for urticaria.   Cool the affected area with a fan, cold flannel, ice pack or soothing moisturising lotion.    Treatment of refractory acute  urticaria  If non-sedating antihistamines are not effective, a 4 to 5-day course of oral prednisone (prednisolone) may be warranted in severe acute urticaria, particularly if there is angioedema. Systemic steroids do not speed up the resolution of symptoms.  Intramuscular injection of adrenaline (epinephrine) is reserved for life-threatening anaphylaxis or swelling of the throat.     Scribe Attestation      I,:  Katrin Garay MA am acting as a scribe while in the presence of the attending physician.:       I,:  Veronica Auguste MD personally performed the services described in this documentation    as scribed in my presence.:                   [1] No Known Allergies  [2]   Current Outpatient Medications:     EPINEPHrine (EPIPEN) 0.3 mg/0.3 mL SOAJ, Inject 0.3 mL (0.3 mg total) into a muscle once for 1 dose, Disp: 0.6 mL, Rfl: 0    levothyroxine 75 mcg tablet, Take 75 mcg by mouth daily, Disp: , Rfl:

## 2025-06-18 ENCOUNTER — APPOINTMENT (OUTPATIENT)
Dept: LAB | Facility: CLINIC | Age: 50
End: 2025-06-18
Payer: COMMERCIAL

## 2025-06-18 ENCOUNTER — APPOINTMENT (OUTPATIENT)
Dept: LAB | Facility: AMBULARY SURGERY CENTER | Age: 50
End: 2025-06-18
Payer: COMMERCIAL

## 2025-06-18 DIAGNOSIS — T78.3XXA GIANT URTICARIA, INITIAL ENCOUNTER: ICD-10-CM

## 2025-06-18 DIAGNOSIS — L50.9 URTICARIA, UNSPECIFIED: ICD-10-CM

## 2025-06-18 DIAGNOSIS — T78.3XXA ANGIOEDEMA, INITIAL ENCOUNTER: ICD-10-CM

## 2025-06-18 DIAGNOSIS — L50.9 URTICARIA: ICD-10-CM

## 2025-06-18 DIAGNOSIS — E55.9 VITAMIN D DEFICIENCY: ICD-10-CM

## 2025-06-18 LAB
25(OH)D3 SERPL-MCNC: 22 NG/ML (ref 30–100)
ALBUMIN SERPL BCG-MCNC: 3.8 G/DL (ref 3.5–5)
ALP SERPL-CCNC: 66 U/L (ref 34–104)
ALT SERPL W P-5'-P-CCNC: 10 U/L (ref 7–52)
ANION GAP SERPL CALCULATED.3IONS-SCNC: 2 MMOL/L (ref 4–13)
AST SERPL W P-5'-P-CCNC: 12 U/L (ref 13–39)
BASOPHILS # BLD AUTO: 0.01 THOUSANDS/ÂΜL (ref 0–0.1)
BASOPHILS NFR BLD AUTO: 0 % (ref 0–1)
BILIRUB SERPL-MCNC: 0.33 MG/DL (ref 0.2–1)
BUN SERPL-MCNC: 8 MG/DL (ref 5–25)
C4 SERPL-MCNC: 32 MG/DL (ref 19–52)
CALCIUM SERPL-MCNC: 8.3 MG/DL (ref 8.4–10.2)
CHLORIDE SERPL-SCNC: 107 MMOL/L (ref 96–108)
CO2 SERPL-SCNC: 27 MMOL/L (ref 21–32)
CREAT SERPL-MCNC: 0.42 MG/DL (ref 0.6–1.3)
CRP SERPL HS-MCNC: 3.97 MG/L
EOSINOPHIL # BLD AUTO: 0.05 THOUSAND/ÂΜL (ref 0–0.61)
EOSINOPHIL NFR BLD AUTO: 1 % (ref 0–6)
ERYTHROCYTE [DISTWIDTH] IN BLOOD BY AUTOMATED COUNT: 14.8 % (ref 11.6–15.1)
ERYTHROCYTE [SEDIMENTATION RATE] IN BLOOD: 2 MM/HOUR (ref 0–19)
GFR SERPL CREATININE-BSD FRML MDRD: 122 ML/MIN/1.73SQ M
GLUCOSE SERPL-MCNC: 103 MG/DL (ref 65–140)
HCT VFR BLD AUTO: 38.3 % (ref 34.8–46.1)
HGB BLD-MCNC: 13 G/DL (ref 11.5–15.4)
IMM GRANULOCYTES # BLD AUTO: 0.02 THOUSAND/UL (ref 0–0.2)
IMM GRANULOCYTES NFR BLD AUTO: 0 % (ref 0–2)
LYMPHOCYTES # BLD AUTO: 1.76 THOUSANDS/ÂΜL (ref 0.6–4.47)
LYMPHOCYTES NFR BLD AUTO: 19 % (ref 14–44)
MCH RBC QN AUTO: 28 PG (ref 26.8–34.3)
MCHC RBC AUTO-ENTMCNC: 33.9 G/DL (ref 31.4–37.4)
MCV RBC AUTO: 83 FL (ref 82–98)
MONOCYTES # BLD AUTO: 0.3 THOUSAND/ÂΜL (ref 0.17–1.22)
MONOCYTES NFR BLD AUTO: 3 % (ref 4–12)
NEUTROPHILS # BLD AUTO: 7.19 THOUSANDS/ÂΜL (ref 1.85–7.62)
NEUTS SEG NFR BLD AUTO: 77 % (ref 43–75)
NRBC BLD AUTO-RTO: 0 /100 WBCS
PLATELET # BLD AUTO: 280 THOUSANDS/UL (ref 149–390)
PMV BLD AUTO: 10.9 FL (ref 8.9–12.7)
POTASSIUM SERPL-SCNC: 3.7 MMOL/L (ref 3.5–5.3)
PROT SERPL-MCNC: 6.3 G/DL (ref 6.4–8.4)
RBC # BLD AUTO: 4.64 MILLION/UL (ref 3.81–5.12)
RHEUMATOID FACT SERPL-ACNC: <10 IU/ML
SODIUM SERPL-SCNC: 136 MMOL/L (ref 135–147)
WBC # BLD AUTO: 9.33 THOUSAND/UL (ref 4.31–10.16)

## 2025-06-18 PROCEDURE — 80074 ACUTE HEPATITIS PANEL: CPT

## 2025-06-18 PROCEDURE — 86376 MICROSOMAL ANTIBODY EACH: CPT

## 2025-06-18 PROCEDURE — 85025 COMPLETE CBC W/AUTO DIFF WBC: CPT

## 2025-06-18 PROCEDURE — 86160 COMPLEMENT ANTIGEN: CPT

## 2025-06-18 PROCEDURE — 86800 THYROGLOBULIN ANTIBODY: CPT

## 2025-06-18 PROCEDURE — 84443 ASSAY THYROID STIM HORMONE: CPT

## 2025-06-18 PROCEDURE — 84460 ALANINE AMINO (ALT) (SGPT): CPT

## 2025-06-18 PROCEDURE — 86849 IMMUNOLOGY PROCEDURE: CPT

## 2025-06-18 PROCEDURE — 82595 ASSAY OF CRYOGLOBULIN: CPT

## 2025-06-18 PROCEDURE — 86431 RHEUMATOID FACTOR QUANT: CPT

## 2025-06-18 PROCEDURE — 86141 C-REACTIVE PROTEIN HS: CPT

## 2025-06-18 PROCEDURE — 36415 COLL VENOUS BLD VENIPUNCTURE: CPT

## 2025-06-18 PROCEDURE — 85652 RBC SED RATE AUTOMATED: CPT

## 2025-06-18 PROCEDURE — 82306 VITAMIN D 25 HYDROXY: CPT

## 2025-06-18 PROCEDURE — 83520 IMMUNOASSAY QUANT NOS NONAB: CPT

## 2025-06-18 PROCEDURE — 80053 COMPREHEN METABOLIC PANEL: CPT

## 2025-06-18 PROCEDURE — 84166 PROTEIN E-PHORESIS/URINE/CSF: CPT

## 2025-06-18 PROCEDURE — 82785 ASSAY OF IGE: CPT

## 2025-06-18 PROCEDURE — 86161 COMPLEMENT/FUNCTION ACTIVITY: CPT

## 2025-06-18 PROCEDURE — 84165 PROTEIN E-PHORESIS SERUM: CPT

## 2025-06-18 PROCEDURE — 82955 ASSAY OF G6PD ENZYME: CPT

## 2025-06-19 LAB
G6PD BLD QN: 276 U/10E12 RBC (ref 127–427)
HAV IGM SER QL: NORMAL
HBV CORE IGM SER QL: NORMAL
HBV SURFACE AG SER QL: NORMAL
HCV AB SER QL: NORMAL
RBC # BLD AUTO: 4.67 X10E6/UL (ref 3.77–5.28)
THYROGLOB AB SERPL-ACNC: <1 IU/ML (ref 0–0.9)
THYROPEROXIDASE AB SERPL-ACNC: <9 IU/ML (ref 0–34)

## 2025-06-20 LAB
C1INH ACT/NOR SERPL: >105 %MEAN NORMAL
TOTAL IGE SMQN RAST: 7.56 KU/L (ref 0–113)

## 2025-06-22 LAB
ALBUMIN SERPL ELPH-MCNC: 3.48 G/DL (ref 3.2–5.1)
ALBUMIN SERPL ELPH-MCNC: 57 % (ref 48–70)
ALBUMIN UR ELPH-MCNC: 100 %
ALPHA1 GLOB MFR UR ELPH: 0 %
ALPHA1 GLOB SERPL ELPH-MCNC: 0.34 G/DL (ref 0.15–0.47)
ALPHA1 GLOB SERPL ELPH-MCNC: 5.5 % (ref 1.8–7)
ALPHA2 GLOB MFR UR ELPH: 0 %
ALPHA2 GLOB SERPL ELPH-MCNC: 0.63 G/DL (ref 0.42–1.04)
ALPHA2 GLOB SERPL ELPH-MCNC: 10.3 % (ref 5.9–14.9)
B-GLOBULIN MFR UR ELPH: 0 %
BETA GLOB ABNORMAL SERPL ELPH-MCNC: 0.43 G/DL (ref 0.31–0.57)
BETA1 GLOB SERPL ELPH-MCNC: 7 % (ref 4.7–7.7)
BETA2 GLOB SERPL ELPH-MCNC: 5.8 % (ref 3.1–7.9)
BETA2+GAMMA GLOB SERPL ELPH-MCNC: 0.35 G/DL (ref 0.2–0.58)
GAMMA GLOB ABNORMAL SERPL ELPH-MCNC: 0.88 G/DL (ref 0.4–1.66)
GAMMA GLOB MFR UR ELPH: 0 %
GAMMA GLOB SERPL ELPH-MCNC: 14.4 % (ref 6.9–22.3)
IGG/ALB SER: 1.33 {RATIO} (ref 1.1–1.8)
PROT SERPL-MCNC: 6.1 G/DL (ref 6.4–8.4)
PROT UR-MCNC: <4 MG/DL

## 2025-06-22 PROCEDURE — 84166 PROTEIN E-PHORESIS/URINE/CSF: CPT | Performed by: STUDENT IN AN ORGANIZED HEALTH CARE EDUCATION/TRAINING PROGRAM

## 2025-06-22 PROCEDURE — 84165 PROTEIN E-PHORESIS SERUM: CPT | Performed by: STUDENT IN AN ORGANIZED HEALTH CARE EDUCATION/TRAINING PROGRAM

## 2025-06-23 LAB
C1INH SERPL-MCNC: 35 MG/DL (ref 21–39)
CRYOGLOB SER QL 1D COLD INC: NORMAL
TRYPTASE SERPL-MCNC: 23.3 UG/L (ref 2.2–13.2)

## 2025-06-24 ENCOUNTER — APPOINTMENT (OUTPATIENT)
Dept: LAB | Facility: CLINIC | Age: 50
End: 2025-06-24
Payer: COMMERCIAL

## 2025-06-24 DIAGNOSIS — R74.8 ELEVATED SERUM TRYPTASE: ICD-10-CM

## 2025-06-24 PROCEDURE — 36415 COLL VENOUS BLD VENIPUNCTURE: CPT

## 2025-06-25 LAB — C1Q SERPL-MCNC: 14.7 MG/DL (ref 10.3–20.5)

## 2025-06-30 LAB — MISCELLANEOUS LAB TEST RESULT: NORMAL

## 2025-07-02 LAB — MISCELLANEOUS LAB TEST RESULT: NORMAL
